# Patient Record
Sex: MALE | Race: WHITE | NOT HISPANIC OR LATINO | URBAN - METROPOLITAN AREA
[De-identification: names, ages, dates, MRNs, and addresses within clinical notes are randomized per-mention and may not be internally consistent; named-entity substitution may affect disease eponyms.]

---

## 2023-11-10 DIAGNOSIS — Z12.11 ENCOUNTER FOR SCREENING COLONOSCOPY: Primary | ICD-10-CM

## 2023-11-10 RX ORDER — POLYETHYLENE GLYCOL 3350, SODIUM SULFATE ANHYDROUS, SODIUM BICARBONATE, SODIUM CHLORIDE, POTASSIUM CHLORIDE 236; 22.74; 6.74; 5.86; 2.97 G/4L; G/4L; G/4L; G/4L; G/4L
4000 POWDER, FOR SOLUTION ORAL ONCE
Qty: 4000 ML | Refills: 0 | Status: SHIPPED | OUTPATIENT
Start: 2023-11-10 | End: 2023-11-10

## 2023-12-15 ENCOUNTER — APPOINTMENT (OUTPATIENT)
Dept: GASTROENTEROLOGY | Facility: EXTERNAL LOCATION | Age: 63
End: 2023-12-15
Payer: COMMERCIAL

## 2024-02-16 ENCOUNTER — OFFICE VISIT (OUTPATIENT)
Dept: GASTROENTEROLOGY | Facility: EXTERNAL LOCATION | Age: 64
End: 2024-02-16
Payer: COMMERCIAL

## 2024-02-16 DIAGNOSIS — Z12.11 ENCOUNTER FOR SCREENING FOR MALIGNANT NEOPLASM OF COLON: ICD-10-CM

## 2024-02-16 DIAGNOSIS — D12.2 BENIGN NEOPLASM OF ASCENDING COLON: Primary | ICD-10-CM

## 2024-02-16 DIAGNOSIS — D12.0 BENIGN NEOPLASM OF CECUM: ICD-10-CM

## 2024-02-16 PROCEDURE — 45385 COLONOSCOPY W/LESION REMOVAL: CPT | Performed by: INTERNAL MEDICINE

## 2024-02-16 PROCEDURE — 45380 COLONOSCOPY AND BIOPSY: CPT | Performed by: INTERNAL MEDICINE

## 2024-02-16 PROCEDURE — 88305 TISSUE EXAM BY PATHOLOGIST: CPT | Performed by: STUDENT IN AN ORGANIZED HEALTH CARE EDUCATION/TRAINING PROGRAM

## 2024-02-16 PROCEDURE — 88305 TISSUE EXAM BY PATHOLOGIST: CPT

## 2024-02-19 ENCOUNTER — LAB REQUISITION (OUTPATIENT)
Dept: LAB | Facility: HOSPITAL | Age: 64
End: 2024-02-19
Payer: COMMERCIAL

## 2024-02-22 LAB
LABORATORY COMMENT REPORT: NORMAL
PATH REPORT.FINAL DX SPEC: NORMAL
PATH REPORT.GROSS SPEC: NORMAL
PATH REPORT.RELEVANT HX SPEC: NORMAL
PATH REPORT.TOTAL CANCER: NORMAL

## 2024-02-22 NOTE — RESULT ENCOUNTER NOTE
The polyps removed from your colon were adenomas (benign but precancerous).  The recommendation is to repeat colonoscopy in 5 years.      Maximus Hylton MD

## 2025-01-17 ENCOUNTER — ANCILLARY PROCEDURE (OUTPATIENT)
Dept: URGENT CARE | Age: 65
End: 2025-01-17
Payer: COMMERCIAL

## 2025-01-17 ENCOUNTER — OFFICE VISIT (OUTPATIENT)
Dept: URGENT CARE | Age: 65
End: 2025-01-17
Payer: COMMERCIAL

## 2025-01-17 VITALS
DIASTOLIC BLOOD PRESSURE: 98 MMHG | TEMPERATURE: 98 F | HEART RATE: 108 BPM | SYSTOLIC BLOOD PRESSURE: 182 MMHG | OXYGEN SATURATION: 99 %

## 2025-01-17 DIAGNOSIS — L03.116 CELLULITIS OF LEFT FOOT: Primary | ICD-10-CM

## 2025-01-17 DIAGNOSIS — M79.672 LEFT FOOT PAIN: ICD-10-CM

## 2025-01-17 PROCEDURE — 73630 X-RAY EXAM OF FOOT: CPT | Mod: LEFT SIDE | Performed by: PHYSICIAN ASSISTANT

## 2025-01-17 RX ORDER — CLINDAMYCIN HYDROCHLORIDE 300 MG/1
300 CAPSULE ORAL 3 TIMES DAILY
Qty: 30 CAPSULE | Refills: 0 | Status: SHIPPED | OUTPATIENT
Start: 2025-01-17 | End: 2025-01-27

## 2025-01-17 ASSESSMENT — ENCOUNTER SYMPTOMS
MYALGIAS: 1
ARTHRALGIAS: 1
CONSTITUTIONAL NEGATIVE: 1

## 2025-01-18 ENCOUNTER — HOSPITAL ENCOUNTER (EMERGENCY)
Facility: HOSPITAL | Age: 65
Discharge: HOME | End: 2025-01-18
Payer: COMMERCIAL

## 2025-01-18 VITALS
RESPIRATION RATE: 18 BRPM | DIASTOLIC BLOOD PRESSURE: 87 MMHG | HEIGHT: 68 IN | WEIGHT: 178 LBS | HEART RATE: 75 BPM | SYSTOLIC BLOOD PRESSURE: 135 MMHG | TEMPERATURE: 97.8 F | OXYGEN SATURATION: 98 % | BODY MASS INDEX: 26.98 KG/M2

## 2025-01-18 DIAGNOSIS — L02.612 FOOT ABSCESS, LEFT: Primary | ICD-10-CM

## 2025-01-18 LAB
ALBUMIN SERPL BCP-MCNC: 4.1 G/DL (ref 3.4–5)
ALP SERPL-CCNC: 69 U/L (ref 33–136)
ALT SERPL W P-5'-P-CCNC: 43 U/L (ref 10–52)
ANION GAP SERPL CALC-SCNC: 8 MMOL/L (ref 10–20)
AST SERPL W P-5'-P-CCNC: 26 U/L (ref 9–39)
BASOPHILS # BLD AUTO: 0.04 X10*3/UL (ref 0–0.1)
BASOPHILS NFR BLD AUTO: 0.5 %
BILIRUB SERPL-MCNC: 0.5 MG/DL (ref 0–1.2)
BUN SERPL-MCNC: 20 MG/DL (ref 6–23)
CALCIUM SERPL-MCNC: 9 MG/DL (ref 8.6–10.3)
CHLORIDE SERPL-SCNC: 104 MMOL/L (ref 98–107)
CO2 SERPL-SCNC: 30 MMOL/L (ref 21–32)
CREAT SERPL-MCNC: 1.12 MG/DL (ref 0.5–1.3)
CRP SERPL-MCNC: 1.41 MG/DL
EGFRCR SERPLBLD CKD-EPI 2021: 74 ML/MIN/1.73M*2
EOSINOPHIL # BLD AUTO: 0.28 X10*3/UL (ref 0–0.7)
EOSINOPHIL NFR BLD AUTO: 3.3 %
ERYTHROCYTE [DISTWIDTH] IN BLOOD BY AUTOMATED COUNT: 12.6 % (ref 11.5–14.5)
ERYTHROCYTE [SEDIMENTATION RATE] IN BLOOD BY WESTERGREN METHOD: 23 MM/H (ref 0–20)
GLUCOSE SERPL-MCNC: 93 MG/DL (ref 74–99)
HCT VFR BLD AUTO: 43.1 % (ref 41–52)
HGB BLD-MCNC: 15 G/DL (ref 13.5–17.5)
IMM GRANULOCYTES # BLD AUTO: 0.03 X10*3/UL (ref 0–0.7)
IMM GRANULOCYTES NFR BLD AUTO: 0.4 % (ref 0–0.9)
LACTATE SERPL-SCNC: 0.9 MMOL/L (ref 0.4–2)
LYMPHOCYTES # BLD AUTO: 1.26 X10*3/UL (ref 1.2–4.8)
LYMPHOCYTES NFR BLD AUTO: 14.8 %
MCH RBC QN AUTO: 31.1 PG (ref 26–34)
MCHC RBC AUTO-ENTMCNC: 34.8 G/DL (ref 32–36)
MCV RBC AUTO: 89 FL (ref 80–100)
MONOCYTES # BLD AUTO: 0.7 X10*3/UL (ref 0.1–1)
MONOCYTES NFR BLD AUTO: 8.2 %
NEUTROPHILS # BLD AUTO: 6.18 X10*3/UL (ref 1.2–7.7)
NEUTROPHILS NFR BLD AUTO: 72.8 %
NRBC BLD-RTO: 0 /100 WBCS (ref 0–0)
PLATELET # BLD AUTO: 337 X10*3/UL (ref 150–450)
POTASSIUM SERPL-SCNC: 4.7 MMOL/L (ref 3.5–5.3)
PROT SERPL-MCNC: 7.4 G/DL (ref 6.4–8.2)
RBC # BLD AUTO: 4.83 X10*6/UL (ref 4.5–5.9)
SODIUM SERPL-SCNC: 137 MMOL/L (ref 136–145)
WBC # BLD AUTO: 8.5 X10*3/UL (ref 4.4–11.3)

## 2025-01-18 PROCEDURE — 85025 COMPLETE CBC W/AUTO DIFF WBC: CPT | Performed by: PHYSICIAN ASSISTANT

## 2025-01-18 PROCEDURE — 10060 I&D ABSCESS SIMPLE/SINGLE: CPT | Performed by: PHYSICIAN ASSISTANT

## 2025-01-18 PROCEDURE — 2500000004 HC RX 250 GENERAL PHARMACY W/ HCPCS (ALT 636 FOR OP/ED): Performed by: PHYSICIAN ASSISTANT

## 2025-01-18 PROCEDURE — 85652 RBC SED RATE AUTOMATED: CPT | Performed by: PHYSICIAN ASSISTANT

## 2025-01-18 PROCEDURE — 80053 COMPREHEN METABOLIC PANEL: CPT | Performed by: PHYSICIAN ASSISTANT

## 2025-01-18 PROCEDURE — 99283 EMERGENCY DEPT VISIT LOW MDM: CPT | Mod: 25

## 2025-01-18 PROCEDURE — 86140 C-REACTIVE PROTEIN: CPT | Performed by: PHYSICIAN ASSISTANT

## 2025-01-18 PROCEDURE — 87070 CULTURE OTHR SPECIMN AEROBIC: CPT | Mod: AHULAB | Performed by: PHYSICIAN ASSISTANT

## 2025-01-18 PROCEDURE — 83605 ASSAY OF LACTIC ACID: CPT | Performed by: PHYSICIAN ASSISTANT

## 2025-01-18 PROCEDURE — 36415 COLL VENOUS BLD VENIPUNCTURE: CPT | Performed by: PHYSICIAN ASSISTANT

## 2025-01-18 RX ORDER — SULFAMETHOXAZOLE AND TRIMETHOPRIM 800; 160 MG/1; MG/1
1 TABLET ORAL EVERY 12 HOURS
Qty: 14 TABLET | Refills: 0 | Status: SHIPPED | OUTPATIENT
Start: 2025-01-18 | End: 2025-01-25

## 2025-01-18 RX ORDER — LIDOCAINE HYDROCHLORIDE 10 MG/ML
10 INJECTION, SOLUTION INFILTRATION; PERINEURAL ONCE
Status: COMPLETED | OUTPATIENT
Start: 2025-01-18 | End: 2025-01-18

## 2025-01-18 RX ADMIN — LIDOCAINE HYDROCHLORIDE 10 ML: 10 INJECTION, SOLUTION INFILTRATION; PERINEURAL at 11:42

## 2025-01-18 ASSESSMENT — PAIN SCALES - GENERAL: PAINLEVEL_OUTOF10: 6

## 2025-01-18 ASSESSMENT — COLUMBIA-SUICIDE SEVERITY RATING SCALE - C-SSRS
6. HAVE YOU EVER DONE ANYTHING, STARTED TO DO ANYTHING, OR PREPARED TO DO ANYTHING TO END YOUR LIFE?: NO
2. HAVE YOU ACTUALLY HAD ANY THOUGHTS OF KILLING YOURSELF?: NO
1. IN THE PAST MONTH, HAVE YOU WISHED YOU WERE DEAD OR WISHED YOU COULD GO TO SLEEP AND NOT WAKE UP?: NO

## 2025-01-18 ASSESSMENT — PAIN DESCRIPTION - LOCATION: LOCATION: FOOT

## 2025-01-18 ASSESSMENT — PAIN DESCRIPTION - PROGRESSION: CLINICAL_PROGRESSION: NOT CHANGED

## 2025-01-18 ASSESSMENT — PAIN - FUNCTIONAL ASSESSMENT: PAIN_FUNCTIONAL_ASSESSMENT: 0-10

## 2025-01-18 NOTE — ED TRIAGE NOTES
Pt to ED after being seen at urgent care to rule out a foot infection stating they do not have a CT machine too look at soft tissue. Pt believe he may have injured it while running on the treadmill months ago. Pt is awake, alert, and ambulatory with ABCs in tact.

## 2025-01-18 NOTE — PROGRESS NOTES
Subjective   Patient ID: Paulie Workman is a 64 y.o. male. They present today with a chief complaint of Foot Injury (Pt. Isn't sure what exactly he did but his left foot is painful x 2 days. ).    History of Present Illness  Injury to his left foot 2 weeks ago.  Initially sore between the last 2 toes so he put some cream on the area because he thought it was a small cut or a sore.  Pain, redness and swelling spread.  He was seen in [facility] and diagnosed with gout.  He took the medicine, but it did not help.  His left foot is painful, red and swollen.      History provided by:  Patient   used: No    Foot Injury         Past Medical History  Allergies as of 01/17/2025    (No Known Allergies)       (Not in a hospital admission)       Past Medical History:   Diagnosis Date    Acute bronchiolitis, unspecified 10/10/2013    Bronchiolitis    Cough, unspecified 10/04/2013    Cough       Past Surgical History:   Procedure Laterality Date    TONSILLECTOMY  11/07/2013    Tonsillectomy    VASECTOMY  11/07/2013    Surgery Vas Deferens Vasectomy            Review of Systems  Review of Systems   Constitutional: Negative.    Musculoskeletal:  Positive for arthralgias and myalgias.   Skin:         Skin of the left foot is red and swollen                                  Objective    Vitals:    01/17/25 1209   BP: (!) 182/98   BP Location: Left arm   Patient Position: Sitting   BP Cuff Size: Large adult   Pulse: 108   Temp: 36.7 °C (98 °F)   TempSrc: Oral   SpO2: 99%     No LMP for male patient.    Physical Exam  Vitals and nursing note reviewed.   Constitutional:       General: He is not in acute distress.     Appearance: Normal appearance. He is normal weight.   HENT:      Head: Normocephalic and atraumatic.   Cardiovascular:      Rate and Rhythm: Tachycardia present.   Pulmonary:      Effort: Pulmonary effort is normal. No respiratory distress.   Musculoskeletal:         General: Swelling, tenderness and  signs of injury present. No deformity. Normal range of motion.      Comments: Lateral left and dorsal left foot swollen, slightly red.  Toes swollen and slightly red.  No obvious skin injury.  + mild to moderate tenderness.    Neurological:      General: No focal deficit present.      Mental Status: He is alert and oriented to person, place, and time.         Procedures    Point of Care Test & Imaging Results from this visit  No results found for this visit on 01/17/25.   XR foot left 3+ views    Result Date: 1/17/2025  Interpreted By:  Marquise Mari, STUDY: XR FOOT LEFT 3+ VIEWS;  1/17/2025 12:18 pm   INDICATION: Signs/Symptoms:foot pain.   COMPARISON: None.   ACCESSION NUMBER(S): [id number]   ORDERING CLINICIAN: JONAH ROWE   FINDINGS: No acute fracture or dislocation is identified. The medial aspect of the 5th metatarsal head appears lucent on the frontal view only favoring projectional artifact. There is fusion of the 5th toe distal interphalangeal joint. The remaining joint spaces are maintained. Bone alignment is within normal limits. Mild soft tissue swelling is noted in the lateral forefoot adjacent to the 5th metatarsophalangeal joint. No radiopaque foreign body or definite soft tissue gas is visualized.       No acute osseous abnormality. Lateral forefoot soft tissue swelling.   MACRO None   Signed by: Marquise Mari 1/17/2025 12:35 PM Dictation workstation:   [id number]     Diagnostic study results (if any) were reviewed by Jonah Rowe PA-C.    Assessment/Plan   Allergies, medications, history, and pertinent labs/EKGs/Imaging reviewed by Jonah Rowe PA-C.     Medical Decision Making  Hx and PE is more consistent with a mild cellulitis of the left foot than gout.  Will treat with antibiotics and refer to podiatry for follow up.  If he has severe pain, swelling, or develops concerning sx, he is encouraged to go to ER    Orders and Diagnoses  Diagnoses and all orders for this  visit:  Cellulitis of left foot  -     clindamycin (Cleocin HCL) 300 mg capsule; Take 1 capsule (300 mg) by mouth 3 times a day for 10 days.  -     Referral to Podiatry; Future  Left foot pain  -     XR foot left 3+ views      Medical Admin Record      Patient disposition: Home    Electronically signed by Yee Rowe PA-C  8:43 PM

## 2025-01-18 NOTE — ED PROVIDER NOTES
HPI     CC: Foot Injury and possible foot infection     HPI: Paulie Workman is a 64 y.o. male with no past medical history presents with concern for left foot pain.  Patient reports that about 9 to 10 days ago, he started running.  He noticed some pain between his fourth and fifth toes and thought that he had rubbed in the wrong way when running.  The pain continued that week, so he went to an urgent care on Tuesday.  They diagnosed him with possible gout and did obtain blood work which I viewed on his phone.  White blood cell count at the time was 8.3.  He reports he was also started on colchicine but did not have significant improvement.  He noted that it started to swell more over the past few days so he went to urgent care yesterday.  X-rays were obtained which showed no fractures and she placed him on clindamycin in case it was early infection.  He presents today as he feels it is not improving.  Does not feel that it significantly worsening and he has had no fever, episodes like this before, or recent trauma.  He reports no numbness or tingling.    ROS: 10-point review of systems was performed and is otherwise negative except as noted in HPI.      Past Medical History: Noncontributory except per HPI     Past Surgical History: Noncontributory except per HPI     Family History: Reviewed and noncontributory     Social History:  Noncontributory except per HPI       No Known Allergies    Past Medical History:   Diagnosis Date    Acute bronchiolitis, unspecified 10/10/2013    Bronchiolitis    Cough, unspecified 10/04/2013    Cough       Home Meds:   Current Outpatient Medications   Medication Instructions    clindamycin (CLEOCIN HCL) 300 mg, oral, 3 times daily    sulfamethoxazole-trimethoprim (Bactrim DS) 800-160 mg tablet 1 tablet, oral, Every 12 hours        ED Triage Vitals [01/18/25 0724]   Temperature Heart Rate Respirations BP   36.6 °C (97.8 °F) 80 16 (!) 143/99      Pulse Ox Temp src Heart Rate Source  "Patient Position   99 % -- -- --      BP Location FiO2 (%)     -- --         Heart Rate:  [75-80]   Temperature:  [36.6 °C (97.8 °F)]   Respirations:  [16-18]   BP: (135-143)/(87-99)   Height:  [172.7 cm (5' 8\")]   Weight:  [80.7 kg (178 lb)]   Pulse Ox:  [98 %-99 %]      Physical Exam:  Physical Exam  Vitals and nursing note reviewed.   Constitutional:       General: He is not in acute distress.     Appearance: Normal appearance. He is not ill-appearing.   HENT:      Head: Normocephalic and atraumatic.      Right Ear: External ear normal.      Left Ear: External ear normal.      Nose: Nose normal.      Mouth/Throat:      Mouth: Mucous membranes are moist.   Eyes:      Extraocular Movements: Extraocular movements intact.      Conjunctiva/sclera: Conjunctivae normal.      Pupils: Pupils are equal, round, and reactive to light.   Cardiovascular:      Rate and Rhythm: Normal rate and regular rhythm.      Pulses: Normal pulses.   Pulmonary:      Effort: Pulmonary effort is normal. No respiratory distress.      Breath sounds: Normal breath sounds.   Abdominal:      General: Abdomen is flat.      Palpations: Abdomen is soft.      Tenderness: There is no abdominal tenderness.   Musculoskeletal:      Cervical back: Normal range of motion and neck supple.      Comments: See photos: Left foot with erythema between toes 4 and 5 extending about 1 to 2 inches into the proximal foot.  Most area of tenderness is between fourth and fifth toes at the base of the digits with notable area of abscess/skin thinning and likely underlying purulence.  Capillary refill normal.  Redness is blanching.  No crepitus.  Minimal swelling of the top of the foot.  See photos.   Skin:     General: Skin is warm and dry.      Capillary Refill: Capillary refill takes less than 2 seconds.   Neurological:      General: No focal deficit present.      Mental Status: He is alert and oriented to person, place, and time.   Psychiatric:         Mood and Affect: " Mood normal.                  Diagnostic Results        Labs Reviewed   COMPREHENSIVE METABOLIC PANEL - Abnormal       Result Value    Glucose 93      Sodium 137      Potassium 4.7      Chloride 104      Bicarbonate 30      Anion Gap 8 (*)     Urea Nitrogen 20      Creatinine 1.12      eGFR 74      Calcium 9.0      Albumin 4.1      Alkaline Phosphatase 69      Total Protein 7.4      AST 26      Bilirubin, Total 0.5      ALT 43     C-REACTIVE PROTEIN - Abnormal    C-Reactive Protein 1.41 (*)    SEDIMENTATION RATE, AUTOMATED - Abnormal    Sedimentation Rate 23 (*)    LACTATE - Normal    Lactate 0.9      Narrative:     Venipuncture immediately after or during the administration of Metamizole may lead to falsely low results. Testing should be performed immediately prior to Metamizole dosing.   TISSUE/WOUND CULTURE/SMEAR   CBC WITH AUTO DIFFERENTIAL    WBC 8.5      nRBC 0.0      RBC 4.83      Hemoglobin 15.0      Hematocrit 43.1      MCV 89      MCH 31.1      MCHC 34.8      RDW 12.6      Platelets 337      Neutrophils % 72.8      Immature Granulocytes %, Automated 0.4      Lymphocytes % 14.8      Monocytes % 8.2      Eosinophils % 3.3      Basophils % 0.5      Neutrophils Absolute 6.18      Immature Granulocytes Absolute, Automated 0.03      Lymphocytes Absolute 1.26      Monocytes Absolute 0.70      Eosinophils Absolute 0.28      Basophils Absolute 0.04           No orders to display                 No data recorded                Procedure  Incision and Drainage    Performed by: Asia Balbuena PA-C  Authorized by: Asia Balbuena PA-C    Consent:     Consent obtained:  Verbal    Consent given by:  Patient    Risks, benefits, and alternatives were discussed: yes      Risks discussed:  Bleeding, damage to other organs, incomplete drainage, infection and pain    Alternatives discussed:  No treatment, delayed treatment, alternative treatment and observation  Universal protocol:     Procedure explained and questions  answered to patient or proxy's satisfaction: yes      Relevant documents present and verified: yes      Test results available : yes      Imaging studies available: yes      Required blood products, implants, devices, and special equipment available: yes      Site/side marked: yes      Immediately prior to procedure, a time out was called: yes      Patient identity confirmed:  Verbally with patient  Location:     Type:  Abscess    Size:  1.5 cm by less than 0.5 cm    Location:  Lower extremity    Lower extremity location:  Toe    Toe location: Between fourth and fifth toe.  Pre-procedure details:     Skin preparation:  Antiseptic wash  Sedation:     Sedation type:  None  Anesthesia:     Anesthesia method:  Local infiltration    Local anesthetic:  Lidocaine 1% w/o epi  Procedure type:     Complexity:  Simple  Procedure details:     Ultrasound guidance: no      Needle aspiration: no      Incision types:  Stab incision    Incision depth:  Dermal    Techniques: Extracted with pressure.    Drainage:  Purulent    Drainage amount:  Scant    Wound treatment:  Wound left open    Packing materials:  None  Post-procedure details:     Procedure completion:  Tolerated      ED Course & MDM   Assessment/Plan:     Medications   lidocaine (Xylocaine) 10 mg/mL (1 %) injection 10 mL (10 mL subcutaneous Given 1/18/25 1142)        Diagnoses as of 01/18/25 1231   Foot abscess, left       Medical Decision Making    Paulie Workman is a 64 y.o. male with no past medical history presents with concern for left foot pain.  Patient is nontoxic-appearing and vital signs are normal.  Based on symptoms and presentation especially lack of crepitus, differential diagnosis includes local abscess of the foot, cellulitis, NSTI, other missed injury, septic joint, gout.  Patient has no crepitus on exam, has normal vital signs, and has maintained range of motion.  Low suspicion for septic joint, gout, or NSTI.  Also has obvious abscess between toes on  exam.  Will obtain basic labs to ensure no significant worsening or signs of systemic infection and will likely complete incision and drainage of this area.  CMP normal.  CRP mildly elevated at 1.41.  Lactate normal.  Sed rate elevated at 23.  CBC with same white blood cell count at 8.5, no elevation of platelets, and no left shift.  Wound was incised and drained and sent for culture.  Patient tolerated well.  No further workup indicated as the patient's labs are reassuring and he had symptom relief after I&D.    Abscess: Patient was educated about the findings on exam.  We discussed that I will change him from clindamycin to Bactrim for better skin coverage and less side effects.  We discussed that the culture was sent and a post discharge pharmacist will call if antibiotics need to be changed or if it is appropriately treated.  Recommended cleansing the area with soap and water but avoiding scrubs or soaks.  Avoid placing lotion on the area.  We would like this to drain onto 4 x 4's and not close immediately.  Advised that he see his primary care physician within a few days for repeat examination.  Strongly advised returning to the emergency department if this does not improve within the next 24 hours, he develops a new fever after starting antibiotics, redness increases, warmth increases, or swelling increases.  If he is unable to wiggle his toes or foot, he should also return to the emergency department.  Patient was very understanding of this plan of care and felt comfortable returning home.    Disposition: Home    ED Prescriptions       Medication Sig Dispense Start Date End Date Auth. Provider    sulfamethoxazole-trimethoprim (Bactrim DS) 800-160 mg tablet Take 1 tablet by mouth every 12 hours for 7 days. 14 tablet 1/18/2025 1/25/2025 Asia Balbuena PA-C            Social Determinants Affecting Care: none     Asia Balbuena PA-C    This note was dictated by speech recognition. Minor errors in transcription  may be present.     Asia Balbuena PA-C  01/18/25 3828

## 2025-01-19 LAB
BACTERIA SPEC CULT: ABNORMAL
GRAM STN SPEC: ABNORMAL
GRAM STN SPEC: ABNORMAL

## 2025-01-20 LAB
B-LACTAMASE ORGANISM ISLT: NEGATIVE
BACTERIA SPEC CULT: ABNORMAL
GRAM STN SPEC: ABNORMAL
GRAM STN SPEC: ABNORMAL

## 2025-01-21 ENCOUNTER — TELEPHONE (OUTPATIENT)
Dept: PHARMACY | Facility: HOSPITAL | Age: 65
End: 2025-01-21
Payer: COMMERCIAL

## 2025-01-21 NOTE — PROGRESS NOTES
EDPD Note: Antibiotics Reviewed and Warranted    Contacted Mr./Mrs./Ms. Paulie Workman regarding a positive tissue culture/result that was taken during their recent emergency room visit. I completed education with patient . The patient is being treated appropriately with Bactrim DS BID x7.     Patient presented to the ED for pain and swelling of left foot. Previously seen in urgent care and diagnosed with cellulitis and prescribed clindamycin 300mg TID x10. Discharged from ED with Bactrim DS BID x7 and told to discontinue clindamycin. At time of call, patient reports the wound is healing well, he is still experiencing some pain where I&D was performed. Patient reports the swelling has decreased, and the wound drained for about 2 days, and has since stopped. Patient denied fever or chills. Advised patient to finish full course of antibiotic and follow up with PCP or urgent care if symptoms do not completely resolve.  And to follow up with PCP to ensure wound healing.    Susceptibility data from last 90 days.  Collected Specimen Info Organism   01/18/25 Tissue/Biopsy from Other (specify in comments) Mixed Aerobic and Anaerobic Bacteria         No further follow up needed from EDPD Team.     Dori Avitia, PharmD

## 2025-02-06 ENCOUNTER — APPOINTMENT (OUTPATIENT)
Dept: PODIATRY | Facility: CLINIC | Age: 65
End: 2025-02-06
Payer: COMMERCIAL

## 2025-04-08 ENCOUNTER — APPOINTMENT (OUTPATIENT)
Dept: PRIMARY CARE | Facility: CLINIC | Age: 65
End: 2025-04-08
Payer: COMMERCIAL

## 2025-04-08 VITALS
HEART RATE: 80 BPM | DIASTOLIC BLOOD PRESSURE: 92 MMHG | BODY MASS INDEX: 30.05 KG/M2 | WEIGHT: 187 LBS | HEIGHT: 66 IN | SYSTOLIC BLOOD PRESSURE: 162 MMHG | OXYGEN SATURATION: 97 %

## 2025-04-08 DIAGNOSIS — Z12.5 SCREENING FOR PROSTATE CANCER: ICD-10-CM

## 2025-04-08 DIAGNOSIS — Z13.6 SCREENING FOR CARDIOVASCULAR CONDITION: ICD-10-CM

## 2025-04-08 DIAGNOSIS — Z13.29 SCREENING FOR ENDOCRINE DISORDER: ICD-10-CM

## 2025-04-08 DIAGNOSIS — R04.0 FREQUENT EPISTAXIS: ICD-10-CM

## 2025-04-08 DIAGNOSIS — Z13.9 SCREENING FOR CONDITION: ICD-10-CM

## 2025-04-08 DIAGNOSIS — Z13.1 SCREENING FOR DIABETES MELLITUS: ICD-10-CM

## 2025-04-08 DIAGNOSIS — Z11.59 ENCOUNTER FOR HEPATITIS C SCREENING TEST FOR LOW RISK PATIENT: ICD-10-CM

## 2025-04-08 DIAGNOSIS — I10 PRIMARY HYPERTENSION: ICD-10-CM

## 2025-04-08 DIAGNOSIS — Z00.00 ENCOUNTER FOR PREVENTIVE HEALTH EXAMINATION: Primary | ICD-10-CM

## 2025-04-08 PROBLEM — E66.811 OBESITY (BMI 30.0-34.9): Status: ACTIVE | Noted: 2025-04-08

## 2025-04-08 PROBLEM — E78.5 HYPERLIPIDEMIA: Status: ACTIVE | Noted: 2025-04-08

## 2025-04-08 PROBLEM — R73.03 PREDIABETES: Status: ACTIVE | Noted: 2025-04-08

## 2025-04-08 PROCEDURE — 3077F SYST BP >= 140 MM HG: CPT | Performed by: STUDENT IN AN ORGANIZED HEALTH CARE EDUCATION/TRAINING PROGRAM

## 2025-04-08 PROCEDURE — 3080F DIAST BP >= 90 MM HG: CPT | Performed by: STUDENT IN AN ORGANIZED HEALTH CARE EDUCATION/TRAINING PROGRAM

## 2025-04-08 PROCEDURE — 99204 OFFICE O/P NEW MOD 45 MIN: CPT | Performed by: STUDENT IN AN ORGANIZED HEALTH CARE EDUCATION/TRAINING PROGRAM

## 2025-04-08 PROCEDURE — 1036F TOBACCO NON-USER: CPT | Performed by: STUDENT IN AN ORGANIZED HEALTH CARE EDUCATION/TRAINING PROGRAM

## 2025-04-08 PROCEDURE — 3008F BODY MASS INDEX DOCD: CPT | Performed by: STUDENT IN AN ORGANIZED HEALTH CARE EDUCATION/TRAINING PROGRAM

## 2025-04-08 PROCEDURE — 99386 PREV VISIT NEW AGE 40-64: CPT | Performed by: STUDENT IN AN ORGANIZED HEALTH CARE EDUCATION/TRAINING PROGRAM

## 2025-04-08 RX ORDER — AMLODIPINE BESYLATE 5 MG/1
5 TABLET ORAL DAILY
Qty: 30 TABLET | Refills: 1 | Status: SHIPPED | OUTPATIENT
Start: 2025-04-08 | End: 2025-06-07

## 2025-04-08 ASSESSMENT — ANXIETY QUESTIONNAIRES
1. FEELING NERVOUS, ANXIOUS, OR ON EDGE: NOT AT ALL
3. WORRYING TOO MUCH ABOUT DIFFERENT THINGS: NOT AT ALL
GAD7 TOTAL SCORE: 0
7. FEELING AFRAID AS IF SOMETHING AWFUL MIGHT HAPPEN: NOT AT ALL
2. NOT BEING ABLE TO STOP OR CONTROL WORRYING: NOT AT ALL
6. BECOMING EASILY ANNOYED OR IRRITABLE: NOT AT ALL
IF YOU CHECKED OFF ANY PROBLEMS ON THIS QUESTIONNAIRE, HOW DIFFICULT HAVE THESE PROBLEMS MADE IT FOR YOU TO DO YOUR WORK, TAKE CARE OF THINGS AT HOME, OR GET ALONG WITH OTHER PEOPLE: NOT DIFFICULT AT ALL
4. TROUBLE RELAXING: NOT AT ALL
5. BEING SO RESTLESS THAT IT IS HARD TO SIT STILL: NOT AT ALL

## 2025-04-08 ASSESSMENT — PATIENT HEALTH QUESTIONNAIRE - PHQ9
9. THOUGHTS THAT YOU WOULD BE BETTER OFF DEAD, OR OF HURTING YOURSELF: NOT AT ALL
3. TROUBLE FALLING OR STAYING ASLEEP OR SLEEPING TOO MUCH: NOT AT ALL
6. FEELING BAD ABOUT YOURSELF - OR THAT YOU ARE A FAILURE OR HAVE LET YOURSELF OR YOUR FAMILY DOWN: NOT AT ALL
SUM OF ALL RESPONSES TO PHQ QUESTIONS 1-9: 0
7. TROUBLE CONCENTRATING ON THINGS, SUCH AS READING THE NEWSPAPER OR WATCHING TELEVISION: NOT AT ALL
10. IF YOU CHECKED OFF ANY PROBLEMS, HOW DIFFICULT HAVE THESE PROBLEMS MADE IT FOR YOU TO DO YOUR WORK, TAKE CARE OF THINGS AT HOME, OR GET ALONG WITH OTHER PEOPLE: NOT DIFFICULT AT ALL
5. POOR APPETITE OR OVEREATING: NOT AT ALL
2. FEELING DOWN, DEPRESSED OR HOPELESS: NOT AT ALL
8. MOVING OR SPEAKING SO SLOWLY THAT OTHER PEOPLE COULD HAVE NOTICED. OR THE OPPOSITE, BEING SO FIGETY OR RESTLESS THAT YOU HAVE BEEN MOVING AROUND A LOT MORE THAN USUAL: NOT AT ALL
4. FEELING TIRED OR HAVING LITTLE ENERGY: NOT AT ALL
1. LITTLE INTEREST OR PLEASURE IN DOING THINGS: NOT AT ALL
SUM OF ALL RESPONSES TO PHQ9 QUESTIONS 1 AND 2: 0

## 2025-04-08 NOTE — PROGRESS NOTES
Subjective   Patient ID: Paulie Workman is a 64 y.o. male who presents for new patient to est and Annual Exam.    HPI  Working on a making diet changes. - is aware of his foods.   Working on adding regular exercise to their routine.  Colon cancer screening completed. 02/16/2024 - 5 year clearance; 02/2029  Vaccines are not up to date; they are due for: Prevnar 20, MMR.   Dental exam is up to date.  Vision exam is up to date.  Patient is not fasting for labs today.   Social: Tobacco use none      Alcohol use weekend/occasional.     Other concerns addressed today include: Blood pressure.     Hypertension:   - Paulie reports that his blood pressure has consistently been in the 140s mmHg for an extended period. He is currently not on any antihypertensive medications and has not followed up with a primary care provider for over two years. He has a family history of cardiovascular issues; his mother had weight-related cardiac problems, and his father had a stroke approximately 15 years ago and also had hypertension.  - Pauile experiences anxiety at the thought of having his blood pressure checked. He reports symptoms of flushing, headaches, and a sensation of pressure around his head but denies experiencing chest pain, shortness of breath (SOB), or dizziness.    Epistaxis:   - Paulie states that he used to experience sporadic nosebleeds throughout the year, which have now increased to daily occurrences. He has attempted to manage the epistaxis with nasal saline sprays without success. His current method of stopping the bleeding involves direct compression with toilet paper.    Gastroesophageal Reflux Disease (GERD):   - Paulie reports a history of reflux symptoms, initially triggered by coffee consumption, which caused irritation, increased belching, and retrosternal pain. He switched to drinking espressos, which initially alleviated the symptoms, but the pain eventually returned. He now experiences increased  "discomfort during and after meals and has switched to drinking tea. He recalls that his wife once gave him a Prilosec, which provided relief, but he is unsure if the improvement was due to the medication or the reduction in acidic beverage consumption.      Review of Systems  All pertinent positive symptoms are included in the history of present illness.    All other systems have been reviewed and are negative and noncontributory to this patient's current ailments.     Social History     Tobacco Use    Smoking status: Never    Smokeless tobacco: Never   Substance Use Topics    Alcohol use: Yes      Past Surgical History:   Procedure Laterality Date    TONSILLECTOMY  11/07/2013    Tonsillectomy    VASECTOMY  11/07/2013    Surgery Vas Deferens Vasectomy      No Known Allergies     Current Outpatient Medications   Medication Sig Dispense Refill    amLODIPine (Norvasc) 5 mg tablet Take 1 tablet (5 mg) by mouth once daily. 30 tablet 1     No current facility-administered medications for this visit.        Patient Active Problem List   Diagnosis    Hyperlipidemia    Obesity (BMI 30.0-34.9)    Prediabetes      Immunization History   Administered Date(s) Administered    COVID-19, mRNA, LNP-S, PF, 30 mcg/0.3 mL dose 03/26/2021, 04/16/2021, 11/29/2021    Pfizer COVID-19 vaccine, bivalent, age 12 years and older (30 mcg/0.3 mL) 09/30/2022    Pfizer Gray Cap SARS-CoV-2 04/22/2022       Objective   VITALS  BP (!) 162/92   Pulse 80   Ht 1.676 m (5' 6\")   Wt 84.8 kg (187 lb)   SpO2 97%   BMI 30.18 kg/m²      Physical Exam  CONSTITUTIONAL - well nourished, well developed, looks like stated age, in no acute distress, not ill-appearing  SKIN - normal skin color and pigmentation, normal skin turgor without rash, lesions, or nodules visualized  HEAD - no trauma, normocephalic  EYES - pupils are equal and reactive to light, extraocular muscles are intact, and normal external exam  NECK - supple without rigidity, no neck mass was " observed, no thyromegaly or thyroid nodules  CHEST - clear to auscultation, no wheezing, no crackles and no rales, good effort  CARDIAC - regular rate and regular rhythm, no skipped beats, no murmur  ABDOMEN - no organomegaly, soft, nontender, nondistended, normal bowel sounds, no guarding/rebound/rigidity  EXTREMITIES - no edema, no deformities  NEUROLOGICAL - normal gait, normal balance, normal motor, no ataxia, DTRs equal and symmetrical; alert, oriented and no focal signs  PSYCHIATRIC - alert, pleasant and cordial, age-appropriate  IMMUNOLOGIC - no cervical lymphadenopathy     Recent Results (from the past 12 weeks)   CBC and Auto Differential    Collection Time: 01/18/25  8:35 AM   Result Value Ref Range    WBC 8.5 4.4 - 11.3 x10*3/uL    nRBC 0.0 0.0 - 0.0 /100 WBCs    RBC 4.83 4.50 - 5.90 x10*6/uL    Hemoglobin 15.0 13.5 - 17.5 g/dL    Hematocrit 43.1 41.0 - 52.0 %    MCV 89 80 - 100 fL    MCH 31.1 26.0 - 34.0 pg    MCHC 34.8 32.0 - 36.0 g/dL    RDW 12.6 11.5 - 14.5 %    Platelets 337 150 - 450 x10*3/uL    Neutrophils % 72.8 40.0 - 80.0 %    Immature Granulocytes %, Automated 0.4 0.0 - 0.9 %    Lymphocytes % 14.8 13.0 - 44.0 %    Monocytes % 8.2 2.0 - 10.0 %    Eosinophils % 3.3 0.0 - 6.0 %    Basophils % 0.5 0.0 - 2.0 %    Neutrophils Absolute 6.18 1.20 - 7.70 x10*3/uL    Immature Granulocytes Absolute, Automated 0.03 0.00 - 0.70 x10*3/uL    Lymphocytes Absolute 1.26 1.20 - 4.80 x10*3/uL    Monocytes Absolute 0.70 0.10 - 1.00 x10*3/uL    Eosinophils Absolute 0.28 0.00 - 0.70 x10*3/uL    Basophils Absolute 0.04 0.00 - 0.10 x10*3/uL   Comprehensive metabolic panel    Collection Time: 01/18/25  8:35 AM   Result Value Ref Range    Glucose 93 74 - 99 mg/dL    Sodium 137 136 - 145 mmol/L    Potassium 4.7 3.5 - 5.3 mmol/L    Chloride 104 98 - 107 mmol/L    Bicarbonate 30 21 - 32 mmol/L    Anion Gap 8 (L) 10 - 20 mmol/L    Urea Nitrogen 20 6 - 23 mg/dL    Creatinine 1.12 0.50 - 1.30 mg/dL    eGFR 74 >60  mL/min/1.73m*2    Calcium 9.0 8.6 - 10.3 mg/dL    Albumin 4.1 3.4 - 5.0 g/dL    Alkaline Phosphatase 69 33 - 136 U/L    Total Protein 7.4 6.4 - 8.2 g/dL    AST 26 9 - 39 U/L    Bilirubin, Total 0.5 0.0 - 1.2 mg/dL    ALT 43 10 - 52 U/L   C-Reactive Protein    Collection Time: 01/18/25  8:35 AM   Result Value Ref Range    C-Reactive Protein 1.41 (H) <1.00 mg/dL   Sedimentation Rate    Collection Time: 01/18/25  8:35 AM   Result Value Ref Range    Sedimentation Rate 23 (H) 0 - 20 mm/h   Lactate    Collection Time: 01/18/25  8:35 AM   Result Value Ref Range    Lactate 0.9 0.4 - 2.0 mmol/L   Tissue/Wound Culture/Smear    Collection Time: 01/18/25 11:42 AM    Specimen: Other (specify in comments); Tissue/Biopsy   Result Value Ref Range    Tissue/Wound Culture/Smear (4+) Abundant Mixed Aerobic and Anaerobic Bacteria     Beta Lactamase (Cefinase) Negative     Gram Stain (1+) Rare Polymorphonuclear leukocytes (A)     Gram Stain (3+) Moderate Mixed Gram positive bacteria (A)         Assessment/Plan   Diagnoses and all orders for this visit:  Encounter for preventive health examination  A complete history and physical was performed today.  Vaccines are up to date.  Colon cancer screening is up to date.  Fasting labs ordered today include:  -     CBC; Future  -     Comprehensive Metabolic Panel; Future  -     Hemoglobin A1C; Future  -     Lipid Panel; Future  -     TSH with reflex to Free T4 if abnormal; Future  Screening for condition  -     CBC; Future  -     Comprehensive Metabolic Panel; Future  Screening for diabetes mellitus  -     Hemoglobin A1C; Future  Screening for cardiovascular condition  -     Lipid Panel; Future  Screening for endocrine disorder  -     TSH with reflex to Free T4 if abnormal; Future  Encounter for hepatitis C screening test for low risk patient  -     Hepatitis C Antibody; Future  Screening for prostate cancer  -     Prostate Specific Antigen; Future  Primary hypertension - uncontrolled       -       I will be sending over a prescription for amlodipine 5mg to help regulate your blood pressure. Take this medication as prescribed. Some common symptoms are swelling of the feet.        -      Ideally, I would like to see a number below 130/80 consistently. If we don't see any improvements, I will make some adjustments to hopefully get to the bottom of this issue.        -      I recommend you keep a log of you blood pressures to see the trends and have you bring them on your next visit in a month.   Frequent epistaxis      -        Since these have increased in frequency, I am going to go ahead and refer you over to a ear nose and throat specialist to assess the issue. I am also thinking that this could be blood pressure related so if the frequency of bleeds decreases, I would still reach out to ENT to get consulted on the issue.       Please follow up with me in 1 month.

## 2025-04-12 LAB
ALBUMIN SERPL-MCNC: 4.9 G/DL (ref 3.6–5.1)
ALP SERPL-CCNC: 74 U/L (ref 35–144)
ALT SERPL-CCNC: 42 U/L (ref 9–46)
ANION GAP SERPL CALCULATED.4IONS-SCNC: 14 MMOL/L (CALC) (ref 7–17)
AST SERPL-CCNC: 25 U/L (ref 10–35)
BILIRUB SERPL-MCNC: 0.6 MG/DL (ref 0.2–1.2)
BUN SERPL-MCNC: 17 MG/DL (ref 7–25)
CALCIUM SERPL-MCNC: 9.8 MG/DL (ref 8.6–10.3)
CHLORIDE SERPL-SCNC: 101 MMOL/L (ref 98–110)
CHOLEST SERPL-MCNC: 243 MG/DL
CHOLEST/HDLC SERPL: 4.7 (CALC)
CO2 SERPL-SCNC: 24 MMOL/L (ref 20–32)
CREAT SERPL-MCNC: 1.13 MG/DL (ref 0.7–1.35)
EGFRCR SERPLBLD CKD-EPI 2021: 73 ML/MIN/1.73M2
ERYTHROCYTE [DISTWIDTH] IN BLOOD BY AUTOMATED COUNT: 12.8 % (ref 11–15)
EST. AVERAGE GLUCOSE BLD GHB EST-MCNC: 126 MG/DL
EST. AVERAGE GLUCOSE BLD GHB EST-SCNC: 7 MMOL/L
GLUCOSE SERPL-MCNC: 87 MG/DL (ref 65–99)
HBA1C MFR BLD: 6 % OF TOTAL HGB
HCT VFR BLD AUTO: 49.7 % (ref 38.5–50)
HCV AB SERPL QL IA: NORMAL
HDLC SERPL-MCNC: 52 MG/DL
HGB BLD-MCNC: 16.7 G/DL (ref 13.2–17.1)
LDLC SERPL CALC-MCNC: 164 MG/DL (CALC)
MCH RBC QN AUTO: 31 PG (ref 27–33)
MCHC RBC AUTO-ENTMCNC: 33.6 G/DL (ref 32–36)
MCV RBC AUTO: 92.4 FL (ref 80–100)
NONHDLC SERPL-MCNC: 191 MG/DL (CALC)
PLATELET # BLD AUTO: 363 THOUSAND/UL (ref 140–400)
PMV BLD REES-ECKER: 11.2 FL (ref 7.5–12.5)
POTASSIUM SERPL-SCNC: 5 MMOL/L (ref 3.5–5.3)
PROT SERPL-MCNC: 7.9 G/DL (ref 6.1–8.1)
PSA SERPL-MCNC: 1.44 NG/ML
RBC # BLD AUTO: 5.38 MILLION/UL (ref 4.2–5.8)
SODIUM SERPL-SCNC: 139 MMOL/L (ref 135–146)
TRIGL SERPL-MCNC: 135 MG/DL
TSH SERPL-ACNC: 2.61 MIU/L (ref 0.4–4.5)
WBC # BLD AUTO: 6.2 THOUSAND/UL (ref 3.8–10.8)

## 2025-04-15 ENCOUNTER — TELEPHONE (OUTPATIENT)
Dept: PRIMARY CARE | Facility: CLINIC | Age: 65
End: 2025-04-15
Payer: COMMERCIAL

## 2025-04-15 DIAGNOSIS — E78.00 PURE HYPERCHOLESTEROLEMIA: Primary | ICD-10-CM

## 2025-04-15 RX ORDER — ROSUVASTATIN CALCIUM 5 MG/1
5 TABLET, COATED ORAL DAILY
Qty: 90 TABLET | Refills: 1 | Status: SHIPPED | OUTPATIENT
Start: 2025-04-15 | End: 2025-10-12

## 2025-05-22 ENCOUNTER — PATIENT MESSAGE (OUTPATIENT)
Dept: PRIMARY CARE | Facility: CLINIC | Age: 65
End: 2025-05-22
Payer: COMMERCIAL

## 2025-05-22 DIAGNOSIS — E78.00 PURE HYPERCHOLESTEROLEMIA: ICD-10-CM

## 2025-05-22 DIAGNOSIS — I10 PRIMARY HYPERTENSION: ICD-10-CM

## 2025-05-23 RX ORDER — AMLODIPINE BESYLATE 5 MG/1
5 TABLET ORAL DAILY
Qty: 90 TABLET | Refills: 1 | Status: SHIPPED | OUTPATIENT
Start: 2025-05-23 | End: 2025-11-19

## 2025-05-23 RX ORDER — ROSUVASTATIN CALCIUM 5 MG/1
5 TABLET, COATED ORAL DAILY
Qty: 90 TABLET | Refills: 1 | Status: SHIPPED | OUTPATIENT
Start: 2025-05-23 | End: 2025-11-19

## 2025-05-27 ENCOUNTER — PATIENT MESSAGE (OUTPATIENT)
Dept: PRIMARY CARE | Facility: CLINIC | Age: 65
End: 2025-05-27
Payer: COMMERCIAL

## 2025-12-12 ENCOUNTER — APPOINTMENT (OUTPATIENT)
Dept: DERMATOLOGY | Facility: CLINIC | Age: 65
End: 2025-12-12
Payer: COMMERCIAL